# Patient Record
Sex: MALE | Race: WHITE | Employment: FULL TIME | URBAN - METROPOLITAN AREA
[De-identification: names, ages, dates, MRNs, and addresses within clinical notes are randomized per-mention and may not be internally consistent; named-entity substitution may affect disease eponyms.]

---

## 2022-08-17 ENCOUNTER — APPOINTMENT (OUTPATIENT)
Dept: MRI IMAGING | Age: 37
End: 2022-08-17
Payer: COMMERCIAL

## 2022-08-17 ENCOUNTER — APPOINTMENT (OUTPATIENT)
Dept: CT IMAGING | Age: 37
End: 2022-08-17
Payer: COMMERCIAL

## 2022-08-17 ENCOUNTER — HOSPITAL ENCOUNTER (OUTPATIENT)
Age: 37
Setting detail: OBSERVATION
Discharge: HOME | End: 2022-08-18
Attending: EMERGENCY MEDICINE | Admitting: INTERNAL MEDICINE
Payer: COMMERCIAL

## 2022-08-17 DIAGNOSIS — G45.9 TIA (TRANSIENT ISCHEMIC ATTACK): ICD-10-CM

## 2022-08-17 DIAGNOSIS — H53.121 TRANSIENT VISUAL LOSS OF RIGHT EYE: Primary | ICD-10-CM

## 2022-08-17 PROBLEM — H53.129 TRANSIENT VISUAL LOSS: Status: ACTIVE | Noted: 2022-08-17

## 2022-08-17 LAB
ANION GAP SERPL CALCULATED.3IONS-SCNC: 10 MMOL/L (ref 3–16)
BUN BLDV-MCNC: 13 MG/DL (ref 7–20)
CALCIUM SERPL-MCNC: 9.2 MG/DL (ref 8.3–10.6)
CHLORIDE BLD-SCNC: 102 MMOL/L (ref 99–110)
CO2: 25 MMOL/L (ref 21–32)
CREAT SERPL-MCNC: 0.9 MG/DL (ref 0.9–1.3)
GFR AFRICAN AMERICAN: >60
GFR NON-AFRICAN AMERICAN: >60
GLUCOSE BLD-MCNC: 119 MG/DL (ref 70–99)
POTASSIUM SERPL-SCNC: 4.1 MMOL/L (ref 3.5–5.1)
SODIUM BLD-SCNC: 137 MMOL/L (ref 136–145)

## 2022-08-17 PROCEDURE — G0378 HOSPITAL OBSERVATION PER HR: HCPCS

## 2022-08-17 PROCEDURE — 70553 MRI BRAIN STEM W/O & W/DYE: CPT

## 2022-08-17 PROCEDURE — 70450 CT HEAD/BRAIN W/O DYE: CPT

## 2022-08-17 PROCEDURE — 6360000004 HC RX CONTRAST MEDICATION: Performed by: PHYSICIAN ASSISTANT

## 2022-08-17 PROCEDURE — 99285 EMERGENCY DEPT VISIT HI MDM: CPT

## 2022-08-17 PROCEDURE — 6370000000 HC RX 637 (ALT 250 FOR IP): Performed by: PHYSICIAN ASSISTANT

## 2022-08-17 PROCEDURE — 70496 CT ANGIOGRAPHY HEAD: CPT

## 2022-08-17 PROCEDURE — 36415 COLL VENOUS BLD VENIPUNCTURE: CPT

## 2022-08-17 PROCEDURE — A9579 GAD-BASE MR CONTRAST NOS,1ML: HCPCS | Performed by: PHYSICIAN ASSISTANT

## 2022-08-17 PROCEDURE — 80048 BASIC METABOLIC PNL TOTAL CA: CPT

## 2022-08-17 RX ORDER — POLYETHYLENE GLYCOL 3350 17 G/17G
17 POWDER, FOR SOLUTION ORAL DAILY PRN
Status: DISCONTINUED | OUTPATIENT
Start: 2022-08-17 | End: 2022-08-18 | Stop reason: HOSPADM

## 2022-08-17 RX ORDER — CETIRIZINE HYDROCHLORIDE 10 MG/1
10 TABLET ORAL DAILY
COMMUNITY

## 2022-08-17 RX ORDER — ASPIRIN 300 MG/1
300 SUPPOSITORY RECTAL DAILY
Status: DISCONTINUED | OUTPATIENT
Start: 2022-08-18 | End: 2022-08-17

## 2022-08-17 RX ORDER — ONDANSETRON 2 MG/ML
4 INJECTION INTRAMUSCULAR; INTRAVENOUS EVERY 6 HOURS PRN
Status: DISCONTINUED | OUTPATIENT
Start: 2022-08-17 | End: 2022-08-18 | Stop reason: HOSPADM

## 2022-08-17 RX ORDER — ONDANSETRON 4 MG/1
4 TABLET, ORALLY DISINTEGRATING ORAL EVERY 8 HOURS PRN
Status: DISCONTINUED | OUTPATIENT
Start: 2022-08-17 | End: 2022-08-18 | Stop reason: HOSPADM

## 2022-08-17 RX ORDER — ASPIRIN 81 MG/1
81 TABLET ORAL DAILY
Status: DISCONTINUED | OUTPATIENT
Start: 2022-08-18 | End: 2022-08-17

## 2022-08-17 RX ORDER — ACETAMINOPHEN 500 MG
1000 TABLET ORAL ONCE
Status: COMPLETED | OUTPATIENT
Start: 2022-08-17 | End: 2022-08-17

## 2022-08-17 RX ADMIN — ACETAMINOPHEN 1000 MG: 500 TABLET ORAL at 23:38

## 2022-08-17 RX ADMIN — GADOTERIDOL 20 ML: 279.3 INJECTION, SOLUTION INTRAVENOUS at 21:09

## 2022-08-17 RX ADMIN — IOPAMIDOL 75 ML: 755 INJECTION, SOLUTION INTRAVENOUS at 17:49

## 2022-08-17 ASSESSMENT — ENCOUNTER SYMPTOMS
ABDOMINAL PAIN: 0
RHINORRHEA: 0
COUGH: 0
EYE DISCHARGE: 0
SHORTNESS OF BREATH: 0
SORE THROAT: 0
VOMITING: 0
EYE REDNESS: 0
COLOR CHANGE: 0
NAUSEA: 0
EYE PAIN: 0

## 2022-08-17 ASSESSMENT — VISUAL ACUITY
OD: 20/20
OS: 20/20
OU: 20/20

## 2022-08-17 ASSESSMENT — PAIN - FUNCTIONAL ASSESSMENT: PAIN_FUNCTIONAL_ASSESSMENT: NONE - DENIES PAIN

## 2022-08-17 ASSESSMENT — PAIN SCALES - GENERAL: PAINLEVEL_OUTOF10: 3

## 2022-08-17 NOTE — ED PROVIDER NOTES
[address] ENCOUNTER          PHYSICIAN ASSISTANT NOTE       Date of evaluation: 8/17/2022    Chief Complaint     Vision Problem (Pt states he was at work and lost vision in his right for about 2 min then slowly came back in over a min fine now)      History of Present Illness     Laron Vargas is a 30 y.o. male, otherwise healthy, who presents to the ED with complaints of painless vision loss involving his right eye that he first noted around 9:45 AM while he was at work, approximately 4 hours prior to arrival.  He states his right eye vision was completely \"gray\". It lasted approximately 2 minutes and then started to come back gradually from the top to the bottom. He contacted [facility] for evaluation and he was referred into the ED for possible stroke. The patient does state that his right face felt funny at the time but denies paresthesias or numbness. He has otherwise been well without fever, chills, nausea and vomiting. No chest pain or shortness of breath. No headache, dizziness or lightheadedness. He otherwise has no complaints. Review of Systems     Review of Systems   Constitutional:  Negative for chills and fever. HENT:  Negative for congestion, rhinorrhea and sore throat. Eyes:  Positive for visual disturbance (right eye). Negative for pain, discharge and redness. Respiratory:  Negative for cough and shortness of breath. Cardiovascular:  Negative for chest pain and palpitations. Gastrointestinal:  Negative for abdominal pain, nausea and vomiting. Genitourinary:  Negative for decreased urine volume and difficulty urinating. Musculoskeletal:  Negative for arthralgias and myalgias. Skin:  Negative for color change and rash. Neurological:  Negative for dizziness, light-headedness and headaches. All other systems reviewed and are negative.     Past Medical, Surgical, Family, and Social History     He has no past medical history on file.  He has no past surgical history on file. His family history is not on file. He reports that he does not currently use alcohol. He reports that he does not use drugs. Medications     Previous Medications    CETIRIZINE (ZYRTEC) 10 MG TABLET    Take 10 mg by mouth daily       Allergies     He has No Known Allergies. Physical Exam     INITIAL VITALS: BP: 132/85, Temp: 98.9 °F (37.2 °C), Heart Rate: 80, Resp: 16, SpO2: 95 %  Physical Exam  Vitals reviewed. Constitutional:       General: He is not in acute distress. Appearance: He is well-developed and normal weight. HENT:      Head: Normocephalic and atraumatic. Mouth/Throat:      Mouth: Mucous membranes are moist.   Eyes:      Extraocular Movements: Extraocular movements intact. Conjunctiva/sclera: Conjunctivae normal.      Pupils: Pupils are equal, round, and reactive to light. Comments: Visual acuity: OD 20/20, OS 20/20   Neck:      Trachea: Phonation normal.   Cardiovascular:      Rate and Rhythm: Normal rate and regular rhythm. Heart sounds: No murmur heard. No friction rub. No gallop. Pulmonary:      Effort: Pulmonary effort is normal. No respiratory distress. Breath sounds: No wheezing, rhonchi or rales. Abdominal:      General: There is no distension. Palpations: Abdomen is soft. Tenderness: There is no abdominal tenderness. Musculoskeletal:      Cervical back: Normal range of motion and neck supple. Right lower leg: No edema. Left lower leg: No edema. Skin:     General: Skin is warm and dry. Findings: No petechiae or rash. Neurological:      Mental Status: He is alert and oriented to person, place, and time. Cranial Nerves: Cranial nerves are intact. Motor: Motor function is intact. Coordination: Coordination is intact.    Psychiatric:         Mood and Affect: Mood and affect normal.         Behavior: Behavior normal.     RECENT VITALS:  BP: 132/85, Temp: 98.9 °F (37.2 °C), Heart Rate: 80, Resp: 16, SpO2: 95 %     ED Course     Nursing Notes, Past Medical Hx,Past Surgical Hx, Social Hx, Allergies, and Family Hx were reviewed. The patient was given the following medications:  No orders of the defined types were placed in this encounter. CONSULTS:  None    MEDICAL DECISION MAKING / ASSESSMENT / Chris Miles is a 30 y.o. male presenting with painless vision loss involving the right eye that resolved after approximately 2 minutes and states he is back to his baseline at this time. He has stable vital signs with an unremarkable exam, specifically no neurologic deficits. Visual acuity is normal.  IV access was established. Labs include a normal renal panel. Noncontrast head CT and CTA of the head and neck are ordered and pending. This will be turned over to the oncoming provider. Please see their addendum for CT results and final disposition. This patient was also evaluated by the attending physician. All care plans were discussed and agreed upon. Clinical Impression     1.  Transient visual loss of right eye        Disposition     DISPOSITION   Pending     Guillermo Penningtonma  08/17/22 1062

## 2022-08-17 NOTE — ED PROVIDER NOTES
HEMORRHAGE: None. Referred to the accounting CT of the head for additional results. .      IMPRESSION:      No significant intracranial vascular pathology identified. CT HEAD WO CONTRAST   Final Result   Addendum (preliminary) 1 of 1   [ ADDENDUM #1    Addendum:      There is a round low density focus seen posterior to the midbrain on the    left near the cerebral aqueduct. This shows fat density and suggests a    focal lipoma but should be correlated with follow-up MRI to evaluate. Final      No evidence of acute intracranial abnormality. LABS:   Results for orders placed or performed during the hospital encounter of 92/14/10   Basic Metabolic Panel   Result Value Ref Range    Sodium 137 136 - 145 mmol/L    Potassium 4.1 3.5 - 5.1 mmol/L    Chloride 102 99 - 110 mmol/L    CO2 25 21 - 32 mmol/L    Anion Gap 10 3 - 16    Glucose 119 (H) 70 - 99 mg/dL    BUN 13 7 - 20 mg/dL    Creatinine 0.9 0.9 - 1.3 mg/dL    GFR Non-African American >60 >60    GFR African American >60 >60    Calcium 9.2 8.3 - 10.6 mg/dL       RECENT VITALS:  BP: (!) 128/92, Temp: 98.9 °F (37.2 °C), Heart Rate: 80, Resp: 16, SpO2: 95 %     Procedures   None    ED Course        The patient was given the following medications:  Orders Placed This Encounter   Medications    iopamidol (ISOVUE-370) 76 % injection 75 mL    gadoteridol (PROHANCE) injection 20 mL    acetaminophen (TYLENOL) tablet 1,000 mg       CONSULTS:  IP CONSULT TO NEUROLOGY  IP CONSULT TO HOSPITALIST    [facility] / ASSESSMENT / Kandy Daniela is a 30 y.o. male to the emergency department for evaluation of painless vision loss in the right eye, started this morning and lasting about 2 minutes, resolving without intervention. No other neurodeficits reported, no headache or eye pain.   Care was signed out to me by the off going provider pending imaging studies including CT head as well as CTA head and neck with concerns for possible TIA contributing to his vision loss. Imaging study showed a round low-density focus posterior to the midbrain on the left near the cerebral aqueduct. No abnormalities of the vasculature. I spoke with neurology over the phone regarding this abnormal finding on CT head, neurology recommended MRI brain with and without contrast as well as 24 hours of observation to monitor for any return or progression of symptoms. MRI ordered, and was actually able to be completed and read this evening, also suspicious for lipoma. Patient was admitted to the hospitalist service will be monitored in the ED until he is moved to his new treatment location. This patient was also evaluated by the attending physician. All care plans were discussed and agreed upon. Clinical Impression     1. Transient visual loss of right eye      Disposition     PATIENT REFERRED TO:  No follow-up provider specified.     DISCHARGE MEDICATIONS:  New Prescriptions    No medications on file       DISPOSITION Admitted 08/17/2022 08:58:35 PM          Higinio Aguilar PA-C  08/17/22 0109

## 2022-08-18 VITALS
BODY MASS INDEX: 27.38 KG/M2 | DIASTOLIC BLOOD PRESSURE: 80 MMHG | OXYGEN SATURATION: 93 % | HEIGHT: 73 IN | SYSTOLIC BLOOD PRESSURE: 129 MMHG | RESPIRATION RATE: 18 BRPM | TEMPERATURE: 98.2 F | WEIGHT: 206.57 LBS | HEART RATE: 68 BPM

## 2022-08-18 LAB
CHOLESTEROL, TOTAL: 214 MG/DL (ref 0–199)
ESTIMATED AVERAGE GLUCOSE: 96.8 MG/DL
HBA1C MFR BLD: 5 %
HDLC SERPL-MCNC: 31 MG/DL (ref 40–60)
LDL CHOLESTEROL CALCULATED: 145 MG/DL
TRIGL SERPL-MCNC: 192 MG/DL (ref 0–150)
VLDLC SERPL CALC-MCNC: 38 MG/DL

## 2022-08-18 PROCEDURE — APPNB60 APP NON BILLABLE TIME 46-60 MINS

## 2022-08-18 PROCEDURE — 85610 PROTHROMBIN TIME: CPT

## 2022-08-18 PROCEDURE — 85300 ANTITHROMBIN III ACTIVITY: CPT

## 2022-08-18 PROCEDURE — 85670 THROMBIN TIME PLASMA: CPT

## 2022-08-18 PROCEDURE — 83036 HEMOGLOBIN GLYCOSYLATED A1C: CPT

## 2022-08-18 PROCEDURE — G0378 HOSPITAL OBSERVATION PER HR: HCPCS

## 2022-08-18 PROCEDURE — 36415 COLL VENOUS BLD VENIPUNCTURE: CPT

## 2022-08-18 PROCEDURE — 83090 ASSAY OF HOMOCYSTEINE: CPT

## 2022-08-18 PROCEDURE — 85303 CLOT INHIBIT PROT C ACTIVITY: CPT

## 2022-08-18 PROCEDURE — 85301 ANTITHROMBIN III ANTIGEN: CPT

## 2022-08-18 PROCEDURE — 85240 CLOT FACTOR VIII AHG 1 STAGE: CPT

## 2022-08-18 PROCEDURE — 85613 RUSSELL VIPER VENOM DILUTED: CPT

## 2022-08-18 PROCEDURE — 85306 CLOT INHIBIT PROT S FREE: CPT

## 2022-08-18 PROCEDURE — 86146 BETA-2 GLYCOPROTEIN ANTIBODY: CPT

## 2022-08-18 PROCEDURE — 85730 THROMBOPLASTIN TIME PARTIAL: CPT

## 2022-08-18 PROCEDURE — 80061 LIPID PANEL: CPT

## 2022-08-18 PROCEDURE — 81241 F5 GENE: CPT

## 2022-08-18 PROCEDURE — 86147 CARDIOLIPIN ANTIBODY EA IG: CPT

## 2022-08-18 PROCEDURE — 85732 THROMBOPLASTIN TIME PARTIAL: CPT

## 2022-08-18 PROCEDURE — 81291 MTHFR GENE: CPT

## 2022-08-18 RX ORDER — ATORVASTATIN CALCIUM 40 MG/1
40 TABLET, FILM COATED ORAL DAILY
Qty: 30 TABLET | Refills: 3 | Status: SHIPPED | OUTPATIENT
Start: 2022-08-18

## 2022-08-18 RX ORDER — ASPIRIN 81 MG/1
81 TABLET ORAL DAILY
Qty: 90 TABLET | Refills: 1 | Status: SHIPPED | OUTPATIENT
Start: 2022-08-18

## 2022-08-18 ASSESSMENT — PAIN SCALES - GENERAL
PAINLEVEL_OUTOF10: 0

## 2022-08-18 NOTE — DISCHARGE INSTR - COC
Continuity of Care Form    Patient Name: Lashaun Pruitt   :  1985  MRN:  [id number]    Admit date:  2022  Discharge date:  ***    Code Status Order: Full Code   Advance Directives:     Admitting Physician:  Javier Tuttle MD  PCP: No primary care provider on file. Discharging Nurse: [facility] Unit/Room#: 4305/4305-01  Discharging Unit Phone Number: ***    Emergency Contact:   Extended Emergency Contact Information  Primary Emergency Contact: nicolemarlys  Address: [address] Phone: [phone]  Mobile Phone: [phone]  Relation: Spouse  Preferred language: English   needed? No    Past Surgical History:  History reviewed. No pertinent surgical history. Immunization History: There is no immunization history on file for this patient. Active Problems:  Patient Active Problem List   Diagnosis Code    Transient visual loss H53.129       Isolation/Infection:   Isolation            No Isolation          Patient Infection Status       None to display            Nurse Assessment:  Last Vital Signs: /80   Pulse 68   Temp 98.2 °F (36.8 °C) (Oral)   Resp 18   Ht 6' 1\" (1.854 m)   Wt 206 lb 9.1 oz (93.7 kg)   SpO2 93%   BMI 27.25 kg/m²     Last documented pain score (0-10 scale): Pain Level: 0  Last Weight:   Wt Readings from Last 1 Encounters:   22 206 lb 9.1 oz (93.7 kg)     Mental Status:  {IP PT MENTAL STATUS:91993}    IV Access:  { SHERRON IV ACCESS:656709458}    Nursing Mobility/ADLs:  Walking   {CHP DME TIUI:667054801}  Transfer  {CHP DME BZJE:585639522}  Bathing  {CHP DME ZOXM:433692716}  Dressing  {CHP DME MGGT:436048131}  Toileting  {CHP DME JLVI:038530760}  Feeding  {CHP DME RHWE:379224478}  Med Admin  {CHP DME KKP}  Med Delivery   { SHERRON MED Delivery:162543821}    Wound Care Documentation and Therapy:        Elimination:  Continence:    Bowel: {YES / CE:17158}  Bladder: {YES / JY:89586}  Urinary Catheter: {Urinary Catheter:265075583}   Colostomy/Ileostomy/Ileal Conduit: {YES / :26881}       Date of Last BM: ***    Intake/Output Summary (Last 24 hours) at 2022 1715  Last data filed at 2022 1459  Gross per 24 hour   Intake 260 ml   Output --   Net 260 ml     I/O last 3 completed shifts:   In: 61 [P.O.:60]  Out: -     Safety Concerns:     [address] Concerns:831075250}    Impairments/Disabilities:      [address] SHERRON Impairments/Disabilities:898148020}    Nutrition Therapy:  Current Nutrition Therapy:   [facility] SHERRON Diet List:953985321}    Routes of Feeding: {CHP DME Other Feedings:312680384}  Liquids: {Slp liquid thickness:04466}  Daily Fluid Restriction: {CHP DME Yes amt example:824288329}  Last Modified Barium Swallow with Video (Video Swallowing Test): {Done Not Done LGGQ:506350439}    Treatments at the Time of Hospital Discharge:   Respiratory Treatments: ***  Oxygen Therapy:  {Therapy; copd oxygen:30574}  Ventilator:    { CC Vent HHBQ:400047161}    Rehab Therapies: {THERAPEUTIC INTERVENTION:1903880289}  Weight Bearing Status/Restrictions: [facility] CC Weight Bearin}  Other Medical Equipment (for information only, NOT a DME order):  {EQUIPMENT:730838047}  Other Treatments: ***    Patient's personal belongings (please select all that are sent with patient):  {CHP DME Belongings:453149886}    RN SIGNATURE:  {Esignature:522554595}    CASE MANAGEMENT/SOCIAL WORK SECTION    Inpatient Status Date: ***    Readmission Risk Assessment Score:  Readmission Risk              Risk of Unplanned Readmission:  0           Discharging to Facility/ Agency   Name:   Address:  Phone:  Fax:    Dialysis Facility (if applicable)   Name:  Address:  Dialysis Schedule:  Phone:  Fax:    / signature: {Esignature:728538748}    PHYSICIAN SECTION    Prognosis: {Prognosis:3113218646}    Condition at Discharge: [address] Patient Condition:811401689}    Rehab Potential (if transferring to Rehab): {Prognosis:4487356638}    Recommended Labs or Other Treatments After Discharge: ***    Physician Certification: I certify the above information and transfer of Jolene Doran  is necessary for the continuing treatment of the diagnosis listed and that he requires {Admit to Appropriate Level of Care:11637} for {GREATER/LESS:458564662} 30 days.      Update Admission H&P: {CHP DME Changes in ZRW:868543162}    PHYSICIAN SIGNATURE:  {Esignature:716518721}

## 2022-08-18 NOTE — PLAN OF CARE
Problem: Discharge Planning  Goal: Discharge to home or other facility with appropriate resources  Outcome: Progressing  Flowsheets (Taken 8/17/2022 225 by Enma Kemp RN)  Discharge to home or other facility with appropriate resources:   Identify barriers to discharge with patient and caregiver   Arrange for interpreters to assist at discharge as needed   Arrange for needed discharge resources and transportation as appropriate   Identify discharge learning needs (meds, wound care, etc)     Problem: Pain  Goal: Verbalizes/displays adequate comfort level or baseline comfort level  Outcome: Progressing     Problem: ABCDS Injury Assessment  Goal: Absence of physical injury  Outcome: Progressing

## 2022-08-18 NOTE — PROGRESS NOTES
4 Eyes Admission Assessment     I agree as the admission nurse that 2 RN's have performed a thorough Head to Toe Skin Assessment on the patient. ALL assessment sites listed below have been assessed on admission. Areas assessed by both nurses: Admission  [x]   Head, Face, and Ears   [x]   Shoulders, Back, and Chest  [x]   Arms, Elbows, and Hands   [x]   Coccyx, Sacrum, and Ischium  [x]   Legs, Feet, and Heels        Does the Patient have Skin Breakdown?   No         Lakhwinder Prevention initiated:  No   Wound Care Orders initiated:  No      [employer] nurse consulted for Pressure Injury (Stage 3,4, Unstageable, DTI, NWPT, and Complex wounds) or Lakhwinder score 18 or lower:  No      Nurse 1 eSignature: Electronically signed by Tatiana Romero RN on 8/17/22 at 11:27 PM EDT    **SHARE this note so that the co-signing nurse is able to place an eSignature**    Nurse 2 eSignature: Electronically signed by Stevenson Maddox RN on 8/17/22 at 11:59 PM EDT

## 2022-08-18 NOTE — DISCHARGE SUMMARY
Hospital Discharge Summary    Patient's PCP: No primary care provider on file. Admit Date: 8/17/2022   Discharge Date: 8/18/22    Admitting Physician: Dr. Fernanda Porter MD  Discharge Physician: Dr. Roberto Ferguson MD     Consults:   IP CONSULT TO NEUROLOGY  IP CONSULT TO HOSPITALIST    Brief HPI: 30 y.o. male who presents with complaints of transient vision loss in the right eye while he was at work. Patient states initially he could not see on the medial aspect and then he completely lost vision on the right for 2-3 minutes. Initially it started as blurry vision and became gray. Currently patient has no visual defects. Patient also denies any changes in speech, focal motor or sensory deficits, headache, nausea or vomiting. Patient has contacted [address] [place] who advised him to come to ED to rule out TIA. Patient does not have any risk factors for CVA/TIA or CAD  Denies any similar episodes in the past     Has a 2-4/4month old baby and states he gets enough rest at night. 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 *      Physical Exam: /80   Pulse 68   Temp 98.2 °F (36.8 °C) (Oral)   Resp 18   Ht 6' 1\" (1.854 m)   Wt 206 lb 9.1 oz (93.7 kg)   SpO2 93%   BMI 27.25 kg/m²   Gen/overall appearance: Not in acute distress. Alert. Head: Normocephalic, atraumatic  Eyes: EOMI, good acuity  ENT:- Oral mucosa moist  Neck: No JVD, thyromegaly  CVS: Nml S1S2, no MRG, RRR  Pulm: Clear bilaterally.  No crackles/wheezes  Gastrointestinal: Soft, NT/ND, +BS  Musculoskeletal: No edema. Warm  Neuro: No focal deficit. Moves extremity spontaneously. Psychiatry: Appropriate affect. Not agitated. Skin: Warm, dry with normal turgor. No rash        Significant diagnostic studies that may require follow up:  CT HEAD WO CONTRAST    Addendum Date: 8/17/2022    [ Jerson Quispe #1 Addendum: There is a round low density focus seen posterior to the midbrain on the left near the cerebral aqueduct. This shows fat density and suggests a focal lipoma but should be correlated with follow-up MRI to evaluate. Result Date: 8/17/2022   ORIGINAL REPORT CT HEAD WITHOUT CONTRAST HISTORY: Transient vision loss in right eye COMPARISON: None Underexposure controls were utilized during the CT examination to meet ALARA standards for radiation dose reduction. FINDINGS: The ventricles are normal in size and configuration with no midline shift or mass effect. There is no evidence of intracranial hemorrhage or abnormal extra-axial fluid collection. There are no definite signs to suggest acute infarction. However, if there is concern of early infarction or other subtle intracranial abnormality, MRI correlation should be considered. Visualized portions of the paranasal sinuses appear clear. No evidence of acute intracranial abnormality. CTA HEAD NECK W CONTRAST    Result Date: 8/17/2022  PROCEDURE: CT ANGIOGRAPHY NECK WITH/WITHOUT CONTRAST INDICATION: transient vision loss right eye COMPARISON: none TECHNIQUE: Limited noncontrast CT imaging performed for the purposes of IV contrast bolus tracking. Axial CT imaging obtained from skull base through the lung apices following administration of IV contrast. Axial images, multiplanar reformatted images, and maximum intensity projection images were reviewed for CT angiographic technique. Individualized dose optimization technique was used in order to meet ALARA standards for radiation dose reduction. In addition to vendor specific dose reduction algorithms, the dose reduction techniques vary based on the specific scanner utilized but frequently include automated exposure control, adjustment of the mA and/or kV according to patient size, and use of iterative reconstruction technique. NASCET criteria used to determine percent stenosis measurements (% stenosis = (1-narrowest diameter/diameter of normal distal segment)x100). IV contrast: 75 mL Isovue-370. FINDINGS: AORTIC ARCH: Standard three-vessel aortic arch anatomy is present. INNOMINATE ARTERY: Normal. RIGHT SUBCLAVIAN ARTERY: Normal. RIGHT VERTEBRAL ARTERY: Normal. RIGHT CAROTID ARTERY: No evidence of atherosclerotic change or significant stenosis is seen in the right common or internal carotid artery. LEFT SUBCLAVIAN ARTERY: Normal. LEFT VERTEBRAL ARTERY: Normal. LEFT CAROTID ARTERY: No evidence of atherosclerotic change or significant stenosis is seen in the left common or internal carotid artery. NECK SOFT TISSUES: No neck soft tissue mass or lymphadenopathy. VISUALIZED MEDIASTINUM: No mass or lymphadenopathy. VISUALIZED LUNG APICES: Clear. BONES: No destructive osseous process. OTHER: None. Unremarkable CTA of the neck with no evidence of significant vascular pathology identified. PROCEDURE: CT ANGIOGRAPHY HEAD WITH/WITHOUT CONTRAST INDICATION: transient vision loss right eye COMPARISON: none TECHNIQUE: Axial CT imaging obtained through the head prior to and following administration of IV contrast. Axial images, multiplanar reformatted images, and maximum intensity projection images were reviewed for CT angiographic technique. IV contrast: 75 mL Isovue-370. FINDINGS: ANTERIOR CIRCULATION: The intracranial internal carotid arteries, anterior cerebral arteries, and middle cerebral arteries demonstrate no occlusion or stenosis. No evidence for aneurysm or arteriovenous malformation.  POSTERIOR CIRCULATION: The bilateral vertebral arteries, basilar artery and posterior cerebral arteries demonstrate no occlusion or stenosis. No evidence for aneurysm or arteriovenous malformation. There is fetal origin of the left posterior cerebral artery. INTRACRANIAL VENOUS SYSTEM: No evidence for intracranial venous thrombosis. INTRACRANIAL HEMORRHAGE: None. Referred to the accounting CT of the head for additional results. . IMPRESSION: No significant intracranial vascular pathology identified. MRI BRAIN W WO CONTRAST    Result Date: 8/17/2022  MRI OF THE BRAIN WITHOUT CONTRAST HISTORY: Abnormality on CT scan with vision loss COMPARISON: None TECHNICAL FACTORS: Standard protocol noncontrast MR imaging was performed of the brain. FINDINGS: The ventricles are normal in size and configuration with no midline shift or mass effect. No evidence of acute hemorrhage or extra-axial fluid collection is seen. No evidence of acute infarction is seen on diffusion imaging. No significant white matter ischemic changes are seen. Focal mass seen posterior to the midbrain on the left shows uniform signal consistent with fat suggestive of lipoma. This does not result in significant mass effect. Midline structures appear normal. Vascular structures appear grossly unremarkable. Visualized portions of the paranasal sinuses appear clear. 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 There is no acute intracranial abnormality seen. Treatments: As above.       Discharge Medications:     Medication List        START taking these medications      aspirin EC 81 MG EC tablet  Take 1 tablet by mouth daily     atorvastatin 40 MG tablet  Commonly known as: Lipitor  Take 1 tablet by mouth daily            CONTINUE taking these medications      cetirizine 10 MG tablet  Commonly known as: ZYRTEC               Where to Get Your Medications        These medications were sent to [facility] [address] [facility] [phone] [address] [place] [address]      Phone: [phone]   aspirin EC 81 MG EC tablet  atorvastatin 40 MG tablet         Activity: activity as tolerated  Diet: No diet orders on file      Disposition: home  Discharged Condition: Stable  Follow Up:   Everett Ingram. [facility] [place], [facility] [place]  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 [address]    Schedule an appointment as soon as possible for a visit      Alexandria Jiménez MD  [address] [place] [address]  [phone]    Schedule an appointment as soon as possible for a visit in 1 month(s)  amaurosis fugax, transeint loss of vision in right eye. Code status:  Prior         Total time spent on discharge, finalizing medications, referrals and arranging outpatient follow up was more than 45 minutes      Thank you Dr. Jones primary care provider on file. for the opportunity to be involved in this patients care.

## 2022-08-18 NOTE — DISCHARGE INSTRUCTIONS
Schedule Echocardiogram: call outpatient scheduling at [phone].  Bring order form attached with discharge paperwork

## 2022-08-18 NOTE — H&P
Hospital Medicine History & Physical      PCP: No primary care provider on file. Date of Admission: 8/17/2022    Date of Service: Pt seen/examined on/17/2022. Placed in Observation. Chief Complaint: Transient visual loss      History Of Present Illness:    30 y.o. male who presents with complaints of transient vision loss in the right eye while he was at work. Patient states initially he could not see on the medial aspect and then he completely lost vision on the right for 2-3 minutes. Initially it started as blurry vision and became gray. Currently patient has no visual defects. Patient also denies any changes in speech, focal motor or sensory deficits, headache, nausea or vomiting. Patient has contacted [id number] [facility] who advised him to come to ED to rule out TIA. Patient does not have any risk factors for CVA/TIA or CAD  Denies any similar episodes in the past    Has a 2-4/4month old baby and states he gets enough rest at night. Denies any personal or family history of simple or complicated migraines    Past Medical History:      History reviewed. No pertinent past medical history. Past Surgical History:      History reviewed. No pertinent surgical history. Medications Prior to Admission:      Prior to Admission medications    Medication Sig Start Date End Date Taking? Authorizing Provider   cetirizine (ZYRTEC) 10 MG tablet Take 10 mg by mouth daily   Yes Historical Provider, MD       Allergies:  Patient has no known allergies. Social History:      The patient currently lives at home    TOBACCO:   has no history on file for tobacco use. ETOH:   reports that he does not currently use alcohol. E-cigarette/Vaping       Questions Responses    E-cigarette/Vaping Use     Start Date     Passive Exposure     Quit Date     Counseling Given     Comments               Family History:    Reviewed and negative in regards to presenting illness/complaint. History reviewed.  No pertinent family history. REVIEW OF SYSTEMS COMPLETED:   Pertinent positives as noted in the HPI. All other systems reviewed and negative. PHYSICAL EXAM PERFORMED:    /85   Pulse 80   Temp 98.9 °F (37.2 °C) (Oral)   Resp 16   Ht 6' 1\" (1.854 m)   Wt 210 lb 1.6 oz (95.3 kg)   SpO2 95%   BMI 27.72 kg/m²     General appearance:  No apparent distress, appears stated age and cooperative. HEENT:  Normal cephalic, atraumatic without obvious deformity. Pupils equal, round, and reactive to light. Extra ocular muscles intact. Conjunctivae/corneas clear. Neck: Supple, with full range of motion. No jugular venous distention. Trachea midline. Respiratory:  Normal respiratory effort. Clear to auscultation, bilaterally without Rales/Wheezes/Rhonchi. Cardiovascular:  Regular rate and rhythm with normal S1/S2 without murmurs, rubs or gallops. Abdomen: Soft, non-tender, non-distended with normal bowel sounds. Musculoskeletal:  No clubbing, cyanosis or edema bilaterally. Full range of motion without deformity. Skin: Skin color, texture, turgor normal.  No rashes or lesions. Neurologic:  Neurovascularly intact without any focal sensory/motor deficits. Cranial nerves: II-XII intact, grossly non-focal.  Vision-intact  Psychiatric:  Alert and oriented, thought content appropriate, normal insight  Capillary Refill: Brisk,3 seconds, normal  Peripheral Pulses: +2 palpable, equal bilaterally       Labs:     No results for input(s): WBC, HGB, HCT, PLT in the last 72 hours. Recent Labs     08/17/22  1525      K 4.1      CO2 25   BUN 13   CREATININE 0.9   CALCIUM 9.2     No results for input(s): AST, ALT, BILIDIR, BILITOT, ALKPHOS in the last 72 hours. No results for input(s): INR in the last 72 hours. No results for input(s): Bianka Shanks in the last 72 hours.     Urinalysis:    No results found for: Jaime Morgan, BACTERIA, RBCUA, BLOODU, [address], [address]    Radiology:     CXR: I have reviewed the CXR with the following interpretation: N/A  EKG:  I have reviewed the EKG with the following interpretation: N/A      Consults:    IP CONSULT TO NEUROLOGY  IP CONSULT TO HOSPITALIST        Active Hospital Problems    Diagnosis Date Noted    Transient visual loss [H53.129] 08/17/2022     Priority: Medium     CT head without contrast  There is a round low density focus seen posterior to the midbrain on the   left near the cerebral aqueduct. This shows fat density and suggests a   focal lipoma but should be correlated with follow-up MRI to evaluate. CTA head and neck with contrast      No significant intracranial vascular pathology identified. 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 There is no acute intracranial abnormality seen. ASSESSMENT:  #Transient visual loss-resolved completely/?  Scotoma versus other  No history of migraine headaches  Imaging studies as above  No risk factors for CAD/CVA or TIA      PLAN:  -Continue neurochecks every 4 hourly  -Neurology consulted by ED, recommended 24-hour observation  -Will check A1c and lipid panel  -No antiplatelets or statins as no evidence of CVA  -Supportive therapy  -Consider neurosurgery evaluation      DVT Prophylaxis: SCDs  Diet: ADULT DIET; Regular  Code Status: Full Code    PT/OT Eval Status: As tolerated    Dispo -GMF with telemetry       Rajeev Irwin MD    Thank you No primary care provider on file. for the opportunity to be involved in this patient's care. If you have any questions or concerns please feel free to contact me at [phone].

## 2022-08-18 NOTE — CARE COORDINATION
Patient is from home and presents to the ED with complaints of painless vision loss involving his right eye that he first noted around 9:45 AM while he was at work, approximately 4 hours prior to arrival.  He states his right eye vision was completely \"gray\". It lasted approximately 2 minutes and then started to come back gradually from the top to the bottom. He contacted [address] [facility] for evaluation and he was referred into the ED for possible stroke. The patient does state that his right face felt funny at the time but denies paresthesias or numbness. His symptoms have resolved and patient is awaiting consult to neuro for these symptoms. He works full time at   [employer] as a  and has no need for any letter of excuse for work. Patient denies any needs at discharge. Patient states he was told that neuro will be seeing him this afternoon. CM will continue to follow patient until discharge.   Electronically signed by Anum Perez RN on 8/18/2022 at 12:13 PM

## 2022-08-18 NOTE — CONSULTS
Neurology / Neurocritical Care Consult Note    Ifeanyi Snyder MD is requesting this consult. Reason for Consult: transient vision loss  Admission Chief Complaint: vision loss     History of Present Illness     Dede Jacob is a 30 y.o. y/o male with no significant medical history who presented to the ED on 8/17 with complaints of transient, painless vision loss in the right eye. Around 9:45 yesterday morning, he was at work sitting in front of his computer when he experienced a \"weird feeling\" all over his body as if he could feel something was \"about to happen\". Simultaneously, he started losing vision in his right eye. The vision loss originated from the medial aspect of his visual field and progressed laterally until the vision in his right eye was completley gone. He describes the vision loss as being \"grayed out\". He denies any associated ocular pain or headache. Nothing made it worse but closing both of his eyes seemed to help. He describes that his vision was out for about 5-6 minutes before it started to return in a top to bottom fashion. He states that his left eye was completely unaffected. He denies double vision, photophobia, numbness, tingling or any lateralizing weakness. His vision completely turned back to normal after the event. He initially called the [facility] who instructed him to make an appointment later that day but then was unable to do so due to them being closed. When he called back they instructed him to go to the nearest emergency department to rule out a stroke. He has not seen an ophthalmologist in a few years and has not seen his PCP in over five years. He denies history of diabetes, hypertension or high cholesterol. When he arrived to the emergency department he had a non-contrast head CT that was notable for a round, low density focus near the left cerebral aqueduct but no acute pathology.  Following MRI with and without contrast was suggestive of a lipoma in the left posterior midbrain. He has never experienced this before but he does admit to a history of \"ocular migraines\" in the past with the most recent being 2 years ago. He describes these events as having \"floaters and zig-zags\" in one of his eyes for about 10-15 minutes then going away with no succeeding headache. He has had maybe 3 or 4 these in his life and they have always occurred while staring at his computer screen which he does a lot of for his job. He is unsure if he experienced this in one eye over the other. REVIEW OF SYSTEMS:   Constitutional- No weight loss or fevers   Eyes- No diplopia. No photophobia. Ears/nose/throat- No dysphagia. No Dysarthria   Cardiovascular- No palpitations. No chest pain   Respiratory- No dyspnea. No Cough   Gastrointestinal- No Abdominal pain. No Vomiting. Genitourinary- No incontinence. No urinary retention   Musculoskeletal- No myalgia. No arthralgia   Skin- No rash. No easy bruising. Psychiatric- No depression. No anxiety   Endocrine- No diabetes. No thyroid issues. Hematologic- No bleeding difficulty. No fatigue   Neurologic- No numbness, tingling, lateralizing weakness, speech changes or headache. Past Medical, Surgical, Family, and Social History   PAST MEDICAL HISTORY:  History reviewed. No pertinent past medical history. SURGICAL HISTORY:  History reviewed. No pertinent surgical history. FAMILY HISTORY & SOCIAL HISTORY:  Family history non-contributory  History reviewed. No pertinent family history.   Social History     Tobacco Use    Smoking status: Never   Substance Use Topics    Alcohol use: Not Currently    Drug use: Never       Allergies & Outpatient Medications   ALLERGIES:  No Known Allergies  HOME MEDICATIONS:  Current Discharge Medication List        CONTINUE these medications which have NOT CHANGED    Details   cetirizine (ZYRTEC) 10 MG tablet Take 10 mg by mouth daily               Physical Exam   PHYSICAL EXAM:  Vitals: 08/17/22 1357 08/17/22 2155 08/17/22 2235 08/18/22 0423   BP: 132/85 (!) 128/92 118/79 115/64   Pulse: 80  73 63   Resp: 16 16 18   Temp: 98.9 °F (37.2 °C)  98.4 °F (36.9 °C) 98.2 °F (36.8 °C)   TempSrc: Oral  Oral Oral   SpO2: 95%  95% 97%   Weight: 210 lb 1.6 oz (95.3 kg)   206 lb 9.1 oz (93.7 kg)   Height: 6' 1\" (1.854 m)            General: Alert, no distress, well-nourished  Neurologic  Mental status:   orientation to person, place, time, situation   Attention intact as able to attend well to the exam     Language fluent in conversation   Comprehension intact; follows simple commands    Cranial nerves:   CN2: Visual fields full w/o extinction on confrontational testing   CN 3,4,6: Pupils equal and reactive to light, extraocular muscles intact  CN5: Facial sensation symmetric   CN7: Face symmetric  CN8: Hearing symmetric to spoken voice  CN9: Palate elevated symmetrically  CN11: Traps full strength on shoulder shrug  CN12: Tongue midline with protrusion    Motor Exam:   R  L    Deltoid 5  5   Biceps 5 5   Triceps 5 5   Wrist extension  5 5   Interossei 5 5      R  L    Hip flexion  5  5   Hip extension  5 5   Knee flexion  5 5   Knee extension  5 5   Ankle dorsiflexion  5 5   Ankle plantar flexion  5 5     Sensory: light touch intact and symmetric in all 4 extremities. No sensory extinction on bilateral simultaneous stimulation  Cerebellar/coordination: finger nose finger normal without ataxia  Tone: normal in all 4 extremities  Gait: held     OTHER SYSTEMS:  Cardiovascular: Warm, appears well perfused   Respiratory: Easy, non-labored respiratory pattern   Abdominal: Abdomen is without distention   Extremities: Upper and lower extremities are atraumatic in appearance without deformity. No swelling or erythema. Diagnostic Testing Results   IMAGES:  Images personally reviewed and agree w/ radiology interpretation.   Head CT w/o Contrast: 8/17/222; 1757  Impression       No evidence of acute intracranial on MRI.  -Okay to discharge from Neurology perspective once hypercoag panel is drawn and ehco is set up for outpatient. ALINA Rosen - [facility]   Neurology & Neurocritical Care   Neurology Line: [phone]  [facility]: [facility] NPs  8/18/2022 8:46 AM    I spent 60 minutes in the care of this patient. Over 50% of that time was in face-to-face counseling regarding disease process, diagnostic testing, preventative measures, and answering patient and family questions.

## 2022-08-18 NOTE — ED PROVIDER NOTES
ED Attending Attestation Note     Date of evaluation: 8/17/2022    This patient was seen by the advance practice provider. I have seen and examined the patient, agree with the workup, evaluation, management and diagnosis. The care plan has been discussed. I have reviewed the ECG and concur with the TYREE's interpretation. My assessment reveals a well-appearing young male sitting up in the hospital stretcher comfortable in no acute distress. PERRLA, EOMI, unremarkable funduscopic exam and a nondilated eye. Symmetrical smile and facial muscle movements. No audible murmurs rubs or gallops. Roscoe Dillon MD  08/17/22 0218

## 2022-08-19 LAB
FACTOR VIII ACTIVITY: 66 % (ref 50–150)
HOMOCYSTEINE: 9 UMOL/L (ref 0–10)

## 2022-08-21 LAB
ANTICARDIOLIPIN IGG ANTIBODY: <10 GPL
ANTITHROMBIN ACTIVITY: 114 % (ref 76–128)
ANTITHROMBIN ANTIGEN: 102 % (ref 82–136)
BETA-2 GLYCOPROTEIN 1 IGG ANTIBODY: <10 SGU
BETA-2 GLYCOPROTEIN 1 IGM ANTIBODY: <10 SMU
CARDIOLIPIN AB IGM: 18 MPL
DRVVT CONFIRMATION TEST: ABNORMAL RATIO
DRVVT SCREEN: 40 SEC (ref 33–44)
DRVVT,DIL: ABNORMAL SEC (ref 33–44)
HEXAGONAL PHOSPHOLIPID NEUTRALIZAT TEST: ABNORMAL
LUPUS ANTICOAG INTERP: ABNORMAL
PLT NEUTA: ABNORMAL
PROTEIN C FUNCTIONAL: 149 % (ref 83–168)
PROTEIN S, FUNCTIONAL: 109 % (ref 66–143)
PT D: 13.7 SEC (ref 12–15.5)
PTT D: 49 SEC (ref 32–48)
PTT-D CORR REFLEX: 41 SEC (ref 32–48)
PTT-HEPARIN NEUTRALIZED: ABNORMAL SEC (ref 32–48)
REPTILASE TIME: ABNORMAL SEC
THROMBIN TIME: 17.7 SEC (ref 14.7–19.5)

## 2022-08-23 LAB
FACTOR V LEIDEN: ABNORMAL
MTHFR BY PCR SPECIMEN: NORMAL
MTHFR INTERPRETATION: NORMAL
MTHFR MUTATION A1298C: NEGATIVE
MTHFR MUTATION C677T: NEGATIVE
SPECIMEN: ABNORMAL

## 2022-08-30 ENCOUNTER — HOSPITAL ENCOUNTER (OUTPATIENT)
Dept: NON INVASIVE DIAGNOSTICS | Age: 37
Discharge: HOME | End: 2022-08-30
Payer: COMMERCIAL

## 2022-08-30 DIAGNOSIS — G45.9 TIA (TRANSIENT ISCHEMIC ATTACK): ICD-10-CM

## 2022-08-30 DIAGNOSIS — H53.121 TRANSIENT VISUAL LOSS OF RIGHT EYE: ICD-10-CM

## 2022-08-30 LAB
LV EF: 58 %
LVEF MODALITY: NORMAL

## 2022-08-30 PROCEDURE — 93306 TTE W/DOPPLER COMPLETE: CPT

## 2022-08-30 NOTE — PROGRESS NOTES
Bubble study x 2 per right AC IV per [facility] RN , pt tarik well, pt in good condition, resp easy/even.

## 2023-08-23 ENCOUNTER — HOSPITAL ENCOUNTER (OUTPATIENT)
Dept: MRI IMAGING | Age: 38
Discharge: HOME | End: 2023-08-23
Attending: NEUROLOGICAL SURGERY
Payer: COMMERCIAL

## 2023-08-23 DIAGNOSIS — D17.0 LIPOMA OF HEAD: ICD-10-CM

## 2023-08-23 PROCEDURE — 70551 MRI BRAIN STEM W/O DYE: CPT
